# Patient Record
Sex: MALE | Race: WHITE | Employment: FULL TIME
[De-identification: names, ages, dates, MRNs, and addresses within clinical notes are randomized per-mention and may not be internally consistent; named-entity substitution may affect disease eponyms.]

---

## 2017-01-06 ENCOUNTER — TELEPHONE (OUTPATIENT)
Dept: FAMILY MEDICINE CLINIC | Facility: CLINIC | Age: 27
End: 2017-01-06

## 2024-10-15 ENCOUNTER — APPOINTMENT (OUTPATIENT)
Dept: OTOLARYNGOLOGY | Facility: CLINIC | Age: 34
End: 2024-10-15
Payer: OTHER GOVERNMENT

## 2025-01-06 ENCOUNTER — HOSPITAL ENCOUNTER (OUTPATIENT)
Dept: RADIOLOGY | Facility: CLINIC | Age: 35
End: 2025-01-06
Payer: OTHER GOVERNMENT

## 2025-01-09 ENCOUNTER — HOSPITAL ENCOUNTER (OUTPATIENT)
Dept: RADIOLOGY | Facility: CLINIC | Age: 35
Discharge: HOME | End: 2025-01-09
Payer: OTHER GOVERNMENT

## 2025-01-09 DIAGNOSIS — N50.811 RIGHT TESTICULAR PAIN: ICD-10-CM

## 2025-01-15 ENCOUNTER — HOSPITAL ENCOUNTER (OUTPATIENT)
Dept: RADIOLOGY | Facility: CLINIC | Age: 35
Discharge: HOME | End: 2025-01-15
Payer: OTHER GOVERNMENT

## 2025-01-15 PROCEDURE — 76870 US EXAM SCROTUM: CPT | Performed by: RADIOLOGY

## 2025-01-15 PROCEDURE — 76870 US EXAM SCROTUM: CPT

## 2025-02-20 ENCOUNTER — APPOINTMENT (OUTPATIENT)
Dept: UROLOGY | Facility: CLINIC | Age: 35
End: 2025-02-20
Payer: OTHER GOVERNMENT

## 2025-03-12 ENCOUNTER — APPOINTMENT (OUTPATIENT)
Dept: UROLOGY | Facility: CLINIC | Age: 35
End: 2025-03-12
Payer: OTHER GOVERNMENT

## 2025-03-12 VITALS — SYSTOLIC BLOOD PRESSURE: 129 MMHG | HEART RATE: 73 BPM | DIASTOLIC BLOOD PRESSURE: 67 MMHG

## 2025-03-12 DIAGNOSIS — I86.1 VARICOCELE: Primary | ICD-10-CM

## 2025-03-12 DIAGNOSIS — R10.31 GROIN PAIN, RIGHT: ICD-10-CM

## 2025-03-12 PROBLEM — M79.646 THUMB PAIN: Status: ACTIVE | Noted: 2025-03-12

## 2025-03-12 PROBLEM — S63.641A SPRAIN OF METACARPOPHALANGEAL (MCP) JOINT OF RIGHT THUMB: Status: ACTIVE | Noted: 2025-03-12

## 2025-03-12 PROCEDURE — 99204 OFFICE O/P NEW MOD 45 MIN: CPT | Performed by: NURSE PRACTITIONER

## 2025-03-12 PROCEDURE — G2211 COMPLEX E/M VISIT ADD ON: HCPCS | Performed by: NURSE PRACTITIONER

## 2025-03-12 NOTE — PATIENT INSTRUCTIONS
Pelvic Floor PT Locations    Rehab Services at [facility]    [address] [place] [address]  [phone]    Gianna Maxwell, PT    B/B*  Monserrat Silverio PT    B [facility]  [address]  [place] [address]  [phone]    Sri Hilario, PT   B [facility], [address]  [place], [place] [address]  [phone]    Sara Gan, PT (PRN ONLY) B/B  Males/females  TRANS   [facility]  [address] [place] [address]  [phone]    Elke Trujillo, PT       B/B*  males/females  Gissel Leal PT          B*  [facility]  [address] [place] [address]  [phone]    Meghann Orozco, PT (PRN)  B  Joanna Jason, PT              B/B [facility]  [address]  [place] [address]  [phone]    Isaak Rogers, PT      B    [facility]   [address]  [address]  [place] [address]  [phone]    Shanta José, PT  B  ATHELETES ONLY     [facility]  [address] [place] [address]  [phone]    Maria Del Carmen Bond, PT B [facility]  [address]  [place] [address]  [phone]    Carlie Mohamud, PT   B [facility]  [address]  [place] [address]  [phone]    Geneva Fontaine, PT     B/B*   [facility]  [address]  [place] [address]  [phone]    Rebecca Frost, PT  B/B*   males/Females [facility]  [address]  [place] [address]  [phone]     Farheen Hurley, PT B/B*  males/females  Carlie Mohamud (Thurs), PT   B*  Linda Rivas, PT  (PRN ONLY)     B/B* males/females  TRANS [facility]  [address]  [place] [address]  [phone]    Eugenia Fierro, PT   B     Nila Espinal, PT    B/B*  males/females   [facility]  [address]  [place] [address]  [phone]    Kary Suazo, PT    B [facility]  [address]  [place] [address]  [phone]    Farheen Garcia, PT           B/B *  TRANS  [facility]  [address]   [address]   [place] [address] [phone]    Monserrat Carrero    B*     [facility]  [address] [place] [address]  [phone]    Bety Mcmahan, PT   B/B  Deanne Martinez, PT   B/B [facility] Physical [facility]  [address] [place] [address]  [phone]    Sil Fair, PT     B/B*   males/females  [facility] Physical [facility]  [address] [place] [address]  [phone]    Katie Pike, PT   B/B*  males/females   [facility]  [address] [place] [address]  [phone]    Sil Fair PT     B/B   males/females   Bettina Sen, PT    B  Leyda Dalal PT     B TRANS [facility] Physical [facility]  [id number] [place] [address].   [place] [address]  [phone]    Estrella Cárdenas, PT  B/B  males/females  Sari Torres, PT  B [facility] Physical [facility]  [address] [place], [place] [id number] [phone]    Miracle Main, PT B/B *  males/females     B/B: Bowel and Bladder    *biofeedback offered  B: Bladder only

## 2025-03-12 NOTE — PROGRESS NOTES
UROLOGIC INITIAL EVALUATION     PROBLEM LIST:  1. Varicocele  Referral to Physical Therapy      2. Groin pain, right  Referral to Physical Therapy           HISTORY OF PRESENT ILLNESS:   Neeraj Melissa is a 34 y.o. 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    PAST SURGICAL HISTORY:  No past surgical history on file.     ALLERGIES:   No Known Allergies     MEDICATIONS:   No current outpatient medications on file prior to visit.     No current facility-administered medications on file prior to visit.        SOCIAL HISTORY:  Patient     Social History     Socioeconomic History    Marital status: Single     Spouse name: Not on file    Number of children: Not on file    Years of education: Not on file    Highest education level: Not on file   Occupational History    Not on file   Tobacco Use    Smoking status: Not on file    Smokeless tobacco: Not on file   Substance and Sexual Activity    Alcohol use: Not on file    Drug use: Not on file    Sexual activity: Not on file   Other Topics Concern    Not on file   Social History Narrative    Not on file     Social Drivers of Health     Financial Resource Strain: Not on file   Food Insecurity: Not on file   Transportation Needs: Not on file   Physical Activity: Not on file   Stress: Not on file  "  Social Connections: Not on file   Intimate Partner Violence: Not on file   Housing Stability: Not on file       FAMILY HISTORY:  No family history on file.    REVIEW OF SYSTEMS:  All systems reviewed, pertinent negatives as noted in HPI.     PHYSICAL EXAM:  Visit Vitals  /67   Pulse 73     Constitutional: Well-developed and well-nourished. No distress.    Head: Normocephalic and atraumatic.    Neck: Normal range of motion.     Pulmonary/Chest: Effort normal. No respiratory distress.   Abdominal: Non-distended.  : See below.  Integumentary: No rash or lesions visualized.  Musculoskeletal: Normal range of motion.    Neurological: Alert, grossly intact.  Psychiatric: Normal mood and affect. Thought content normal.      LABORATORY REVIEW:   No results found for: \"GLU\", \"BUN\", \"CREATININE\", \"EGFR\", \"NA\", \"K\", \"CL\", \"CO2\", \"OSMOLALITY\", \"CALCIUM\"   No results found for: \"WBC\", \"RBC\", \"HGB\", \"HCT\", \"MCV\", \"MCH\", \"MCHC\", \"RDW\", \"PLT\", \"MPV\"     No results found for: \"PSA\"          Assessment:      1. Varicocele  Referral to Physical Therapy      2. Groin pain, right  Referral to Physical Therapy          Neeraj Melissa is a 34 y.o. with varicocele and R groin pain  Hx varicocele repair 2004  Scrotal US 1/15/25 showed B varicocele  I personally reviewed and interpreted imaging  On exam, L>R varicocele  No significant tenderness    Discussed natural hx varicocele; often painless, can affect fertility  In the absence of pain or fertility issues, recommend against intervention  Suspect element of pelvic floor tension  Agreeable to plan as below     Plan:   Refer to pelvic floor PT  RTC with Dr. Zuluaga for discussion of possible varicocelectomy   Encouraged to contact us in the interim with any questions, concerns       "

## 2025-04-09 ENCOUNTER — APPOINTMENT (OUTPATIENT)
Facility: CLINIC | Age: 35
End: 2025-04-09
Payer: OTHER GOVERNMENT

## 2025-05-08 ENCOUNTER — TELEPHONE (OUTPATIENT)
Age: 35
End: 2025-05-08
Payer: OTHER GOVERNMENT

## 2025-05-08 NOTE — TELEPHONE ENCOUNTER
Attempted to Contact Mr Melissa 4 times: 10am 12 pm 2 pm 4pm     Left voicemail and Message on [employer]

## 2025-06-19 ENCOUNTER — APPOINTMENT (OUTPATIENT)
Dept: UROLOGY | Facility: CLINIC | Age: 35
End: 2025-06-19
Payer: OTHER GOVERNMENT

## 2025-06-19 VITALS
HEART RATE: 76 BPM | DIASTOLIC BLOOD PRESSURE: 88 MMHG | TEMPERATURE: 97.3 F | HEIGHT: 70 IN | WEIGHT: 221 LBS | BODY MASS INDEX: 31.64 KG/M2 | SYSTOLIC BLOOD PRESSURE: 139 MMHG

## 2025-06-19 DIAGNOSIS — R10.31 GROIN PAIN, RIGHT: ICD-10-CM

## 2025-06-19 DIAGNOSIS — M62.89 PELVIC FLOOR DYSFUNCTION: Primary | ICD-10-CM

## 2025-06-19 PROCEDURE — 1036F TOBACCO NON-USER: CPT | Performed by: STUDENT IN AN ORGANIZED HEALTH CARE EDUCATION/TRAINING PROGRAM

## 2025-06-19 PROCEDURE — 3008F BODY MASS INDEX DOCD: CPT | Performed by: STUDENT IN AN ORGANIZED HEALTH CARE EDUCATION/TRAINING PROGRAM

## 2025-06-19 PROCEDURE — 99203 OFFICE O/P NEW LOW 30 MIN: CPT | Performed by: STUDENT IN AN ORGANIZED HEALTH CARE EDUCATION/TRAINING PROGRAM

## 2025-06-19 RX ORDER — CHOLECALCIFEROL (VITAMIN D3) 25 MCG
TABLET ORAL
COMMUNITY

## 2025-06-19 RX ORDER — CYCLOBENZAPRINE HCL 5 MG
TABLET ORAL
COMMUNITY
Start: 2025-05-12

## 2025-06-19 RX ORDER — MULTIVIT-MIN/IRON/FOLIC ACID/K 18-600-40
CAPSULE ORAL
COMMUNITY

## 2025-06-19 RX ORDER — ECHINACEA 400 MG
CAPSULE ORAL
COMMUNITY

## 2025-06-19 ASSESSMENT — ENCOUNTER SYMPTOMS
DEPRESSION: 0
LOSS OF SENSATION IN FEET: 0
OCCASIONAL FEELINGS OF UNSTEADINESS: 0

## 2025-06-19 ASSESSMENT — PATIENT HEALTH QUESTIONNAIRE - PHQ9
2. FEELING DOWN, DEPRESSED OR HOPELESS: NOT AT ALL
SUM OF ALL RESPONSES TO PHQ9 QUESTIONS 1 AND 2: 0
1. LITTLE INTEREST OR PLEASURE IN DOING THINGS: NOT AT ALL

## 2025-06-19 NOTE — PROGRESS NOTES
Chief complaint:  Testicle Pain (Right side)  Referring physician:  No ref. provider found     SUBJECTIVE:  HPI:  Neeraj Melissa is a 34 y.o. male with a history of left varicocelectomy who presents for initial evaluation of right scrotal pain.    Seen by TAMMY Berry.  Notes as below.  Today describes chronic back pain, recently seems more like right flank pain radiating to right scrotum, though not always consistent, eg had left flank pain yesterday/today.      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   Surgical history:   has no past surgical history on file.  Family history:  family history is not on file.  Social history:   reports that he has never smoked. He has never used smokeless tobacco. He reports current alcohol use. He reports that he does not use drugs.    Medications:    Current Outpatient Medications   Medication Instructions    ascorbic acid, vitamin C, 500 mg capsule Take by mouth.    cholecalciferol (Vitamin D-3) 25 mcg (1,000 units) tablet Take by mouth.    cyclobenzaprine (Flexeril) 5 mg tablet Take by mouth.    elderberry fruit and flower 460-115 mg capsule Take by mouth.    multivitamin with minerals (multivitamin-iron-folic acid) tablet Take by mouth.      Allergies:    RX Allergies[1]     ROS:  14-point review of systems negative except as noted above.    OBJECTIVE:  Visit  "Vitals  /88   Pulse 76   Temp 36.3 °C (97.3 °F)   Body mass index is 31.71 kg/m².    Physical exam  General:  No acute distress  HEENT:  EOMI  CV:  Regular rate  Pulm:  Nonlabored respirations  Abd:  Soft, non-distended  :  Circumcised phallus, orthotopic patent meatus, bl descended testes without masses, no right varicocele, fullness of left spermatic cord likely remnant from prior varicocele  MSK:  No contractures  Neuro:  Motor intact  Psych:  Appropriate affect    Labs:    No results found for: \"WBC\", \"HGB\", \"HCT\", \"PLT\", \"ALT\", \"AST\", \"NA\", \"K\", \"CL\", \"CREATININE\", \"BUN\", \"CO2\", \"INR\", \"HGBA1C\", \"ALBUR\"No results found for: \"URINECULTURE\" No results found for: \"PSA\"    Imaging:  All imaging discussed in HPI was independently reviewed.    ASSESSMENT:  Right scrotal pain associated with right back/flank pain    Given history now sounding suspicious for kidney stone will exclude that before proceeding with presumptive pelvic floor dysfunction management.    PLAN:  CT a/p noncon first available  Continue PFPT with [facility]    Follow-up phone visit after CT     Wagner Zuluaga MD    Problem List Items Addressed This Visit    None  Visit Diagnoses         Pelvic floor dysfunction    -  Primary      Groin pain, right        Relevant Orders    CT abdomen pelvis wo IV contrast                  [1] No Known Allergies    "

## 2025-06-20 ENCOUNTER — HOSPITAL ENCOUNTER (OUTPATIENT)
Dept: RADIOLOGY | Facility: CLINIC | Age: 35
Discharge: HOME | End: 2025-06-20
Payer: OTHER GOVERNMENT

## 2025-06-20 DIAGNOSIS — R10.31 GROIN PAIN, RIGHT: ICD-10-CM

## 2025-06-20 PROCEDURE — 74176 CT ABD & PELVIS W/O CONTRAST: CPT

## 2025-06-26 ENCOUNTER — APPOINTMENT (OUTPATIENT)
Dept: UROLOGY | Facility: CLINIC | Age: 35
End: 2025-06-26
Payer: OTHER GOVERNMENT

## 2025-06-26 DIAGNOSIS — M62.89 PELVIC FLOOR DYSFUNCTION: ICD-10-CM

## 2025-06-26 DIAGNOSIS — K59.00 CONSTIPATION IN MALE: Primary | ICD-10-CM

## 2025-06-26 PROCEDURE — 1036F TOBACCO NON-USER: CPT | Performed by: STUDENT IN AN ORGANIZED HEALTH CARE EDUCATION/TRAINING PROGRAM

## 2025-06-26 PROCEDURE — 99213 OFFICE O/P EST LOW 20 MIN: CPT | Performed by: STUDENT IN AN ORGANIZED HEALTH CARE EDUCATION/TRAINING PROGRAM

## 2025-06-26 NOTE — PROGRESS NOTES
Chief complaint:  Follow-up (CT results)  Referring physician:  No ref. provider found     Audio/video telehealth visit.  Patient identity and consent for this visit type confirmed.  Time in discussion with patient 12 min.    SUBJECTIVE:  HPI:  Neeraj Melissa is a 34 y.o. male with a history of left varicocelectomy who presents for initial evaluation of right scrotal pain.    6/26/25 - Returns via telehealth.  Symptoms unchanged, largely right paraspinal pain. Reviewed CT - no stones, hydronephrosis bilaterally.  Has marked stool burden in ascending and transverse colon.  6/19/25 - Seen by NP [place].  Notes as below.  Today describes chronic back pain, recently seems more like right flank pain radiating to right scrotum, though not always consistent, eg had left flank pain yesterday/today.       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   Surgical history:   has no past surgical history on file.  Family history:  family history is not on file.  Social history:   reports that he has never smoked. He has never used smokeless tobacco. He reports current alcohol use. He reports that he does not use drugs.    Medications:    Current Outpatient Medications   Medication Instructions    ascorbic acid, vitamin C, 500 mg capsule Take by mouth.    cholecalciferol (Vitamin D-3) 25 mcg (1,000 units) tablet Take  "by mouth.    cyclobenzaprine (Flexeril) 5 mg tablet Take by mouth.    elderberry fruit and flower 460-115 mg capsule Take by mouth.    multivitamin with minerals (multivitamin-iron-folic acid) tablet Take by mouth.      Allergies:    RX Allergies[1]     ROS:  14-point review of systems negative except as noted above.    OBJECTIVE:  There were no vitals taken for this visit.There is no height or weight on file to calculate BMI.    Physical exam  General:  No acute distress  HEENT:  EOMI  Pulm:  Nonlabored respirations  MSK:  No contractures  Neuro:  Motor intact  Psych:  Appropriate affect    Labs:    No results found for: \"WBC\", \"HGB\", \"HCT\", \"PLT\", \"ALT\", \"AST\", \"NA\", \"K\", \"CL\", \"CREATININE\", \"BUN\", \"CO2\", \"INR\", \"HGBA1C\", \"ALBUR\"No results found for: \"URINECULTURE\" No results found for: \"PSA\"    Imaging:  All imaging discussed in HPI was independently reviewed.    ASSESSMENT:  Constipation of ascending/transverse colon  Right scrotal pain, suspect PFD    Discussed reassuring no kidney stones.  Only identifiable etiology of symptoms on imaging is constipation.      PLAN:  Miralax daily for at least 2 weeks  Increase hydration  Continue PFPT with [facility]     Follow-up 3 months    Wagner Zuluaga MD    Problem List Items Addressed This Visit    None  Visit Diagnoses         Constipation in male    -  Primary      Pelvic floor dysfunction                      [1] No Known Allergies    "